# Patient Record
Sex: MALE | URBAN - METROPOLITAN AREA
[De-identification: names, ages, dates, MRNs, and addresses within clinical notes are randomized per-mention and may not be internally consistent; named-entity substitution may affect disease eponyms.]

---

## 2017-03-06 ENCOUNTER — OFFICE VISIT (OUTPATIENT)
Dept: FAMILY MEDICINE | Facility: CLINIC | Age: 37
End: 2017-03-06
Payer: COMMERCIAL

## 2017-03-06 VITALS
HEIGHT: 69 IN | WEIGHT: 212 LBS | DIASTOLIC BLOOD PRESSURE: 70 MMHG | TEMPERATURE: 98 F | BODY MASS INDEX: 31.4 KG/M2 | SYSTOLIC BLOOD PRESSURE: 120 MMHG | HEART RATE: 70 BPM

## 2017-03-06 DIAGNOSIS — Z13.1 DIABETES MELLITUS SCREENING: ICD-10-CM

## 2017-03-06 DIAGNOSIS — K21.9 GASTROESOPHAGEAL REFLUX DISEASE, ESOPHAGITIS PRESENCE NOT SPECIFIED: ICD-10-CM

## 2017-03-06 DIAGNOSIS — R10.31 RLQ ABDOMINAL PAIN: ICD-10-CM

## 2017-03-06 DIAGNOSIS — Z13.220 LIPID SCREENING: ICD-10-CM

## 2017-03-06 DIAGNOSIS — Z00.00 ENCOUNTER FOR ROUTINE ADULT HEALTH EXAMINATION WITHOUT ABNORMAL FINDINGS: Primary | ICD-10-CM

## 2017-03-06 DIAGNOSIS — K63.9 THICKENED SMALL BOWEL: ICD-10-CM

## 2017-03-06 DIAGNOSIS — Z23 NEED FOR PROPHYLACTIC VACCINATION AND INOCULATION AGAINST INFLUENZA: ICD-10-CM

## 2017-03-06 DIAGNOSIS — Z72.0 TOBACCO ABUSE: ICD-10-CM

## 2017-03-06 PROCEDURE — 36415 COLL VENOUS BLD VENIPUNCTURE: CPT | Performed by: PHYSICIAN ASSISTANT

## 2017-03-06 PROCEDURE — 90686 IIV4 VACC NO PRSV 0.5 ML IM: CPT | Performed by: PHYSICIAN ASSISTANT

## 2017-03-06 PROCEDURE — 90471 IMMUNIZATION ADMIN: CPT | Performed by: PHYSICIAN ASSISTANT

## 2017-03-06 PROCEDURE — 99212 OFFICE O/P EST SF 10 MIN: CPT | Mod: 25 | Performed by: PHYSICIAN ASSISTANT

## 2017-03-06 PROCEDURE — 80061 LIPID PANEL: CPT | Performed by: PHYSICIAN ASSISTANT

## 2017-03-06 PROCEDURE — 99395 PREV VISIT EST AGE 18-39: CPT | Mod: 25 | Performed by: PHYSICIAN ASSISTANT

## 2017-03-06 NOTE — PATIENT INSTRUCTIONS
"-- At home check what you are taking for heartburn  If it is Omeprazole (Prilosec) or Nexium you should try to stop this and try Zantac or Pepcid instead. If these medications dont work as well, please discuss with GI because long term risks of the other medications are not good and it is important to figure out if you need these medications or not.      Varenicline Tartrate (Chantix):  Chantix tablets Days                Dosage  0.5 mg                     1 through 3 0.5 mg once daily  0.5 mg                      4 through 7 0.5 mg twice daily  1.0 mg                     Day 8 to end  1 mg twice daily    Renal dosing:  For severe renal impairment, start at a dose of 0.5 mg daily and then titrate as needed to a maximum dose of 0.5 mg twice daily.  For patients with end-stage renal disease (ESRD) undergoing hemodialysis, a maximum dose of 0.5 mg daily may be administered if tolerated well.    How to take:  You will start taking Chantix one week before your quit date while you are still smoking. At first you will use the Chantix starter pack. Once you are finished with that, you will be on a \"maintenance dose\" that will probably stay the same for the rest of your treatment.  After a week of slowly increasing your dose, you will take Chantix twice a day. Take each dose after eating and with a full glass of water. Taking Chantix with food and water decreases nausea. That is also why you begin on a lower dose and slowly increase it. Once you begin the twice a day dosage, your 2 doses should be at least 8-10 hours apart and at least 4-5 hours before bedtime.      Duration of therapy: 12 weeks.  An additional course of 12 weeks of treatment is recommended if the first 12 weeks were successful to improve long term abstinence.    Adverse reactions:  The most common adverse reaction associated with varenicline treatment is nausea. Other common adverse effects include sleep disturbance, constipation, flatulence and " vomiting.    There have been warnings from the FDA to be on the lookout for erratic behavior, suicidal ideation, or suicidal behavior.   There is one case report of a death, though it appears alcohol consumption may have played a part in that case.    Please report any behavior or mood changes as well as being aware of drowsiness.   Be cautious when operating motor vehicles or dangerous machinery until the medication's effect on you is clear.      Preventive Health Recommendations  Male Ages 26 - 39    Yearly exam:             See your health care provider every year in order to  o   Review health changes.   o   Discuss preventive care.    o   Review your medicines if your doctor has prescribed any.    You should be tested each year for STDs (sexually transmitted diseases), if you re at risk.     After age 35, talk to your provider about cholesterol testing. If you are at risk for heart disease, have your cholesterol tested at least every 5 years.     If you are at risk for diabetes, you should have a diabetes test (fasting glucose).  Shots: Get a flu shot each year. Get a tetanus shot every 10 years.     Nutrition:    Eat at least 5 servings of fruits and vegetables daily.     Eat whole-grain bread, whole-wheat pasta and brown rice instead of white grains and rice.     Talk to your provider about Calcium and Vitamin D.     Lifestyle    Exercise for at least 150 minutes a week (30 minutes a day, 5 days a week). This will help you control your weight and prevent disease.     Limit alcohol to one drink per day.     No smoking.     Wear sunscreen to prevent skin cancer.     See your dentist every six months for an exam and cleaning.     HOW TO QUIT SMOKING  Smoking is one of the hardest habits to break. About half of all those who have ever smoked have been able to quit, and most of those (about 70%) who still smoke want to quit. Here are some of the best ways to stop smoking.     KEEP TRYING:  It takes most smokers  about 8 tries before they are finally able to fully quit. So, the more often you try and fail, the better your chance of quitting the next time! So, don't give up!    GO COLD TURKEY:  Most ex-smokers quit cold turkey. Trying to cut back gradually doesn't seem to work as well, perhaps because it continues the smoking habit. Also, it is possible to fool yourself by inhaling more while smoking fewer cigarettes. This results in the same amount of nicotine in your body!    GET SUPPORT:  Support programs can make an important difference, especially for the heavy smoker. These groups offer lectures, methods to change your behavior and peer support. Call the free national Quitline for more information. 800-QUIT-NOW (779-611-0624). Low-cost or free programs are offered by many hospitals, local chapters of the American Lung Association (153-203-6259) and the American Cancer Society (543-831-1436). Support at home is important too. Non-smokers can help by offering praise and encouragement. If the smoker fails to quit, encourage them to try again!    OVER-THE-COUNTER MEDICINES:  For those who can't quit on their own, Nicotine Replacement Therapy (NRT) may make quitting much easier. Certain aids such as the nicotine patch, gum and lozenge are available without a prescription. However, it is best to use these under the guidance of your doctor. The skin patch provides a steady supply of nicotine to the body. Nicotine gum and lozenge gives temporary bursts of low levels of nicotine. Both methods take the edge off the craving for cigarettes. WARNING: If you feel symptoms of nicotine overdose, such as nausea, vomiting, dizziness, weakness, or fast heartbeat, stop using these and see your doctor.    PRESCRIPTION MEDICINES:  After evaluating your smoking patterns and prior attempts at quitting, your doctor may offer a prescription medicine such as bupropion (Zyban, Wellbutrin), varenicline (Chantix, Champix), a niocotine inhaler or  nasal spray. Each has its unique advantage and side effects which your doctor can review with you.    HEALTH BENEFITS OF QUITTING:  The benefits of quitting start right away and keep improving the longer you go without smokin minutes: blood pressure and pulse return to normal  8 hours: oxygen levels return to normal  2 days: ability to smell and taste begins to improve as damaged nerves start to regrow  2-3 weeks: circulation and lung function improves  1-9 months: decreased cough, congestion and shortness of breath; less tired  1 year: risk of heart attack decreases by half  5 years: risk of lung cancer decreases by half; risk of stroke becomes the same as a non-smoker  For information about how to quit smoking, visit the following links:  National [employer] [place] ,   Clearing the Air, Quit Smoking Today   - an online booklet. http://www.smokefree.gov/pubs/clearing_the_air.pdf  Smokefree.gov http://smokefree.gov/  QuitNet http://www.quitnet.com/    7332-9635 [facility], [address] [place] [address]. All rights reserved. This information is not intended as a substitute for professional medical care. Always follow your healthcare professional's instructions.    The Benefits of Living Smoke Free  What do you want to gain from quitting? Check off some reasons to quit.  Health Benefits  ___ Reduce my risk of lung cancer, heart disease, chronic lung disease  ___ Have fewer wrinkles and softer skin  ___ Improve my sense of taste and smell  ___ For pregnant women reduce the risk of having a miscarriage, stillbirth, premature birth, or low-birth-weight baby  Personal Benefits  ___ Feel more in control of my life  ___ Have better-smelling hair, breath, clothes, home, and car  ___ Save time by not having to take smoke breaks, buy cigarettes, or hunt for a light  ___ Have whiter teeth  Family Benefits  ___ Reduce my children s respiratory tract infections  ___ Set a good example for my children  ___  Reduce my family s cancer risk  Financial Benefits  ___ Save hundreds of dollars each year that would be spent on cigarettes  ___ Save money on medical bills  ___ Save on life, health, and car insurance premiums    Those Dollars Add Up!  Cigarettes are expensive, and getting more expensive all the time. Do you realize how much money you are spending on cigarettes per year? What is the average amount you spend on a pack of cigarettes? What is the average number of packs that you smoke per day? Using your answers to these questions, fill in this formula to help you find out:  ($ _____ per pack) ×  ( _____ number of packs per day) × (365 days) =  $ _____ yearly cost of smoking  Besides tobacco, there are other costs, including extra cleaning bills and replacement costs for clothing and furniture; medical expenses for smoking-related illnesses; and higher health, life, and car insurance premiums.    Cigars and Pipes Count Too!  Cigars and pipes are also dangerous. So are smokeless (chewing) tobacco and snuff. All of these products contain nicotine, a highly addictive substance that has harmful effects on your body. Quitting smoking means giving up all tobacco products.      1725-9213 [facility] [place], [address] [place] [address], [place] [address]. All rights reserved. This information is not intended as a substitute for professional medical care. Always follow your healthcare professional's instructions.

## 2017-03-06 NOTE — MR AVS SNAPSHOT
"              After Visit Summary   3/6/2017    Nicholas Rojas    MRN: [id number]           Patient Information     Date Of Birth          1980        Visit Information        Provider Department      3/6/2017 10:20 AM Elenita Givens PA-C [facility]        Today's Diagnoses     Lipid screening    -  1    Diabetes mellitus screening        RLQ abdominal pain        Thickened small bowel        Gastroesophageal reflux disease, esophagitis presence not specified        Tobacco abuse          Care Instructions    -- At home check what you are taking for heartburn  If it is Omeprazole (Prilosec) or Nexium you should try to stop this and try Zantac or Pepcid instead. If these medications dont work as well, please discuss with GI because long term risks of the other medications are not good and it is important to figure out if you need these medications or not.      Varenicline Tartrate (Chantix):  Chantix tablets Days                Dosage  0.5 mg                     1 through 3 0.5 mg once daily  0.5 mg                      4 through 7 0.5 mg twice daily  1.0 mg                     Day 8 to end  1 mg twice daily    Renal dosing:  For severe renal impairment, start at a dose of 0.5 mg daily and then titrate as needed to a maximum dose of 0.5 mg twice daily.  For patients with end-stage renal disease (ESRD) undergoing hemodialysis, a maximum dose of 0.5 mg daily may be administered if tolerated well.    How to take:  You will start taking Chantix one week before your quit date while you are still smoking. At first you will use the Chantix starter pack. Once you are finished with that, you will be on a \"maintenance dose\" that will probably stay the same for the rest of your treatment.  After a week of slowly increasing your dose, you will take Chantix twice a day. Take each dose after eating and with a full glass of water. Taking Chantix with food and water decreases nausea. That is also why you " begin on a lower dose and slowly increase it. Once you begin the twice a day dosage, your 2 doses should be at least 8-10 hours apart and at least 4-5 hours before bedtime.      Duration of therapy: 12 weeks.  An additional course of 12 weeks of treatment is recommended if the first 12 weeks were successful to improve long term abstinence.    Adverse reactions:  The most common adverse reaction associated with varenicline treatment is nausea. Other common adverse effects include sleep disturbance, constipation, flatulence and vomiting.    There have been warnings from the FDA to be on the lookout for erratic behavior, suicidal ideation, or suicidal behavior.   There is one case report of a death, though it appears alcohol consumption may have played a part in that case.    Please report any behavior or mood changes as well as being aware of drowsiness.   Be cautious when operating motor vehicles or dangerous machinery until the medication's effect on you is clear.      Preventive Health Recommendations  Male Ages 26 - 39    Yearly exam:             See your health care provider every year in order to  o   Review health changes.   o   Discuss preventive care.    o   Review your medicines if your doctor has prescribed any.    You should be tested each year for STDs (sexually transmitted diseases), if you re at risk.     After age 35, talk to your provider about cholesterol testing. If you are at risk for heart disease, have your cholesterol tested at least every 5 years.     If you are at risk for diabetes, you should have a diabetes test (fasting glucose).  Shots: Get a flu shot each year. Get a tetanus shot every 10 years.     Nutrition:    Eat at least 5 servings of fruits and vegetables daily.     Eat whole-grain bread, whole-wheat pasta and brown rice instead of white grains and rice.     Talk to your provider about Calcium and Vitamin D.     Lifestyle    Exercise for at least 150 minutes a week (30 minutes a  day, 5 days a week). This will help you control your weight and prevent disease.     Limit alcohol to one drink per day.     No smoking.     Wear sunscreen to prevent skin cancer.     See your dentist every six months for an exam and cleaning.     HOW TO QUIT SMOKING  Smoking is one of the hardest habits to break. About half of all those who have ever smoked have been able to quit, and most of those (about 70%) who still smoke want to quit. Here are some of the best ways to stop smoking.     KEEP TRYING:  It takes most smokers about 8 tries before they are finally able to fully quit. So, the more often you try and fail, the better your chance of quitting the next time! So, don't give up!    GO COLD TURKEY:  Most ex-smokers quit cold turkey. Trying to cut back gradually doesn't seem to work as well, perhaps because it continues the smoking habit. Also, it is possible to fool yourself by inhaling more while smoking fewer cigarettes. This results in the same amount of nicotine in your body!    GET SUPPORT:  Support programs can make an important difference, especially for the heavy smoker. These groups offer lectures, methods to change your behavior and peer support. Call the free national Quitline for more information. 800-QUIT-NOW (123-778-0795). Low-cost or free programs are offered by many hospitals, local chapters of the American Lung Association (853-915-6246) and the American Cancer Society (410-062-8231). Support at home is important too. Non-smokers can help by offering praise and encouragement. If the smoker fails to quit, encourage them to try again!    OVER-THE-COUNTER MEDICINES:  For those who can't quit on their own, Nicotine Replacement Therapy (NRT) may make quitting much easier. Certain aids such as the nicotine patch, gum and lozenge are available without a prescription. However, it is best to use these under the guidance of your doctor. The skin patch provides a steady supply of nicotine to the body.  Nicotine gum and lozenge gives temporary bursts of low levels of nicotine. Both methods take the edge off the craving for cigarettes. WARNING: If you feel symptoms of nicotine overdose, such as nausea, vomiting, dizziness, weakness, or fast heartbeat, stop using these and see your doctor.    PRESCRIPTION MEDICINES:  After evaluating your smoking patterns and prior attempts at quitting, your doctor may offer a prescription medicine such as bupropion (Zyban, Wellbutrin), varenicline (Chantix, Champix), a niocotine inhaler or nasal spray. Each has its unique advantage and side effects which your doctor can review with you.    HEALTH BENEFITS OF QUITTING:  The benefits of quitting start right away and keep improving the longer you go without smokin minutes: blood pressure and pulse return to normal  8 hours: oxygen levels return to normal  2 days: ability to smell and taste begins to improve as damaged nerves start to regrow  2-3 weeks: circulation and lung function improves  1-9 months: decreased cough, congestion and shortness of breath; less tired  1 year: risk of heart attack decreases by half  5 years: risk of lung cancer decreases by half; risk of stroke becomes the same as a non-smoker  For information about how to quit smoking, visit the following links:  National [employer] ,   Clearing the Air, Quit Smoking Today   - an online booklet. http://www.smokefree.gov/pubs/clearing_the_air.pdf  Smokefree.gov http://smokefree.gov/  QuitNet http://www.quitnet.com/    8460-5674 [facility], [address] [place] [address], [place] [address]. All rights reserved. This information is not intended as a substitute for professional medical care. Always follow your healthcare professional's instructions.    The Benefits of Living Smoke Free  What do you want to gain from quitting? Check off some reasons to quit.  Health Benefits  ___ Reduce my risk of lung cancer, heart disease, chronic lung disease  ___ Have fewer  wrinkles and softer skin  ___ Improve my sense of taste and smell  ___ For pregnant women--reduce the risk of having a miscarriage, stillbirth, premature birth, or low-birth-weight baby  Personal Benefits  ___ Feel more in control of my life  ___ Have better-smelling hair, breath, clothes, home, and car  ___ Save time by not having to take smoke breaks, buy cigarettes, or hunt for a light  ___ Have whiter teeth  Family Benefits  ___ Reduce my children s respiratory tract infections  ___ Set a good example for my children  ___ Reduce my family s cancer risk  Financial Benefits  ___ Save hundreds of dollars each year that would be spent on cigarettes  ___ Save money on medical bills  ___ Save on life, health, and car insurance premiums    Those Dollars Add Up!  Cigarettes are expensive, and getting more expensive all the time. Do you realize how much money you are spending on cigarettes per year? What is the average amount you spend on a pack of cigarettes? What is the average number of packs that you smoke per day? Using your answers to these questions, fill in this formula to help you find out:  ($ _____ per pack) ×  ( _____ number of packs per day) × (365 days) =  $ _____ yearly cost of smoking  Besides tobacco, there are other costs, including extra cleaning bills and replacement costs for clothing and furniture; medical expenses for smoking-related illnesses; and higher health, life, and car insurance premiums.    Cigars and Pipes Count Too!  Cigars and pipes are also dangerous. So are smokeless (chewing) tobacco and snuff. All of these products contain nicotine, a highly addictive substance that has harmful effects on your body. Quitting smoking means giving up all tobacco products.      1032-3394 [employer], [address] [place] [address], [place] [address]. All rights reserved. This information is not intended as a substitute for professional medical care. Always follow your healthcare professional's  "instructions.        Follow-ups after your visit        Additional Services     GASTROENTEROLOGY ADULT REF CONSULT ONLY       Preferred Location: [facility] [phone]      Please be aware that coverage of these services is subject to the terms and limitations of your health insurance plan.  Call member services at your health plan with any benefit or coverage questions.  Any procedures must be performed at a [facility] facility OR coordinated by your clinic's referral office.    Please bring the following with you to your appointment:    (1) Any X-Rays, CTs or MRIs which have been performed.  Contact the facility where they were done to arrange for  prior to your scheduled appointment.    (2) List of current medications   (3) This referral request   (4) Any documents/labs given to you for this referral                  Who to contact     If you have questions or need follow up information about today's clinic visit or your schedule please contact [facility] directly at [phone].  Normal or non-critical lab and imaging results will be communicated to you by MyChart, letter or phone within 4 business days after the clinic has received the results. If you do not hear from us within 7 days, please contact the clinic through MyChart or phone. If you have a critical or abnormal lab result, we will notify you by phone as soon as possible.  Submit refill requests through Zipzoom or call your pharmacy and they will forward the refill request to us. Please allow 3 business days for your refill to be completed.          Additional Information About Your Visit        Zipzoom Information     Zipzoom lets you send messages to your doctor, view your test results, renew your prescriptions, schedule appointments and more. To sign up, go to [email or web address] . Click on \"Log in\" on the left side of the screen, which will take you to the Welcome page. Then click on \"Sign up Now\" on the right side of " "the page.     You will be asked to enter the access code listed below, as well as some personal information. Please follow the directions to create your username and password.     Your access code is: [email or web address]  Expires: 2017 11:07 AM     Your access code will  in 90 days. If you need help or a new code, please call your [facility] clinic or [phone].        Care EveryWhere ID     This is your Care EveryWhere ID. This could be used by other organizations to access your [facility] medical records  [id number]        Your Vitals Were     Pulse Temperature Height BMI (Body Mass Index)          70 98  F (36.7  C) (Oral) 5' 8.75\" (1.746 m) 31.54 kg/m2         Blood Pressure from Last 3 Encounters:   17 120/70   16 124/70   16 108/64    Weight from Last 3 Encounters:   17 212 lb (96.2 kg)   16 212 lb (96.2 kg)   16 209 lb (94.8 kg)              We Performed the Following     GASTROENTEROLOGY ADULT REF CONSULT ONLY     Lipid panel reflex to direct LDL     Tobacco Cessation - for Health Maintenance          Today's Medication Changes          These changes are accurate as of: 3/6/17 11:07 AM.  If you have any questions, ask your nurse or doctor.               Start taking these medicines.        Dose/Directions    varenicline 0.5 MG X 11 & 1 MG X 42 tablet   Commonly known as:  CHANTIX STARTING MONTH    Used for:  Tobacco abuse   Started by:  Elenita Givens PA-C        Take 0.5 mg tab daily for 3 days, then 0.5 mg tab twice daily for 4 days, then 1 mg twice daily.   Quantity:  53 tablet   Refills:  0         Stop taking these medicines if you haven't already. Please contact your care team if you have questions.     [employer] NICOTINE POLACRILEX 4 MG gum   Generic drug:  nicotine polacrilex   Stopped by:  Elenita Givens PA-C           OMEPRAZOLE PO   Stopped by:  Elenita Givens PA-C                Where to get your medicines      These medications were sent to [facility] [place] - [address]  [address] [facility] [address]     Phone:  [phone]     varenicline 0.5 MG X 11 & 1 MG X 42 tablet                Primary Care Provider    None Specified       No primary provider on file.        Thank you!     Thank you for choosing [facility]  for your care. Our goal is always to provide you with excellent care. Hearing back from our patients is one way we can continue to improve our services. Please take a few minutes to complete the written survey that you may receive in the mail after your visit with us. Thank you!             Your Updated Medication List - Protect others around you: Learn how to safely use, store and throw away your medicines at www.disposemymeds.org.          This list is accurate as of: 3/6/17 11:07 AM.  Always use your most recent med list.                   Brand Name Dispense Instructions for use    varenicline 0.5 MG X 11 & 1 MG X 42 tablet    CHANTIX STARTING MONTH CHARLI    53 tablet    Take 0.5 mg tab daily for 3 days, then 0.5 mg tab twice daily for 4 days, then 1 mg twice daily.

## 2017-03-06 NOTE — NURSING NOTE
"Chief Complaint   Patient presents with     Physical     Other     Due for Lipids       Initial /70 (BP Location: Right arm, Patient Position: Chair, Cuff Size: Adult Regular)  Pulse 70  Temp 98  F (36.7  C) (Oral)  Ht 5' 8.75\" (1.746 m)  Wt 212 lb (96.2 kg)  BMI 31.54 kg/m2 Estimated body mass index is 31.54 kg/(m^2) as calculated from the following:    Height as of this encounter: 5' 8.75\" (1.746 m).    Weight as of this encounter: 212 lb (96.2 kg).  Medication Reconciliation: complete   Purvi Pascual MA      "

## 2017-03-06 NOTE — PROGRESS NOTES
SUBJECTIVE:     CC: Nicholas Rojas is an 36 year old male who presents for preventative health visit.     Healthy Habits:    Do you get at least three servings of calcium containing foods daily (dairy, green leafy vegetables, etc.)? yes    Amount of exercise or daily activities, outside of work: 0 day(s) per week, planning to start    Problems taking medications regularly No    Medication side effects: No    Have you had an eye exam in the past two years? yes    Do you see a dentist twice per year? yes    Do you have sleep apnea, excessive snoring or daytime drowsiness?no    Follow up in regards to GI issues. He reports improvement but still occurring.  Still has RLQ pain persistent. He had CT that showed some small bowel thickening, but nothing else.  His stools are still normal.  gerd persists, but also still smoking.   Still taking omeprazole d aily, without it has symptoms.    Today's PHQ-2 Score:   PHQ-2 ( 1999 Pfizer) 3/6/2017 11/14/2016   Q1: Little interest or pleasure in doing things 0 0   Q2: Feeling down, depressed or hopeless 0 0   PHQ-2 Score 0 0       Abuse: Current or Past(Physical, Sexual or Emotional)- No  Do you feel safe in your environment - Yes    Social History   Substance Use Topics     Smoking status: Current Every Day Smoker     Types: Cigarettes     Last attempt to quit: 11/11/2016     Smokeless tobacco: Current User     Types: Chew     Alcohol use 0.0 oz/week     0 Standard drinks or equivalent per week      Comment: 2 drinks a day     The patient does not drink >3 drinks per day nor >7 drinks per week.    Last PSA: No results found for: PSA    No results for input(s): CHOL, HDL, LDL, TRIG, CHOLHDLRATIO, NHDL in the last 63195 hours.    Reviewed orders with patient. Reviewed health maintenance and updated orders accordingly - Yes    Reviewed and updated as needed this visit by clinical staff  Tobacco  Allergies  Med Hx  Surg Hx  Fam Hx  Soc Hx        Reviewed and updated as needed  "this visit by Provider            ROS:  A pertinent ROS of the General  ENT  Cardiovascular  Pulmonary  GI  Musculoskeletal   Neurological  Integumentary  Psychological   systems is otherwise unremarkable.         Problem list, Medication list, Allergies, and Medical/Social/Surgical histories reviewed in EPIC and updated as appropriate.  OBJECTIVE:     /70 (BP Location: Right arm, Patient Position: Chair, Cuff Size: Adult Regular)  Pulse 70  Temp 98  F (36.7  C) (Oral)  Ht 5' 8.75\" (1.746 m)  Wt 212 lb (96.2 kg)  BMI 31.54 kg/m2  EXAM:  GENERAL: healthy, alert and no distress  EYES: Eyes grossly normal to inspection, PERRL and conjunctivae and sclerae normal  HENT: ear canals and TM's normal, nose and mouth without ulcers or lesions  NECK: no adenopathy, no asymmetry, masses, or scars and thyroid normal to palpation  RESP: lungs clear to auscultation - no rales, rhonchi or wheezes  CV: regular rate and rhythm, normal S1 S2, no S3 or S4, no murmur, click or rub, no peripheral edema and peripheral pulses strong  ABDOMEN: soft, nontender, no hepatosplenomegaly, no masses and bowel sounds normal  MS: no gross musculoskeletal defects noted, no edema  SKIN: no suspicious lesions or rashes  NEURO: Normal strength and tone, mentation intact and speech normal  PSYCH: mentation appears normal, affect normal/bright    ASSESSMENT/PLAN:     1. Encounter for routine adult health examination without abnormal findings    2. RLQ abdominal pain  3. Thickened small bowel  Discussed options with patient. Pain is still there. Recommend he see gastro to discuss findings on CT along with his ongoing symptoms. He can also see them related to his gerd as he is still taking omeprazole. May need endoscopy. Strongly encouraged him to quit smoking to help with this.   - GASTROENTEROLOGY ADULT REF CONSULT ONLY  - OFFICE/OUTPT VISIT,ESTLEVL III    6. Lipid screening  - Lipid panel reflex to direct LDL    7. Tobacco abuse  Side effects, " "risks and benefits of medication were discussed with patient.   - Tobacco Cessation - for Health Maintenance  - varenicline (CHANTIX STARTING MONTH CHARLI) 0.5 MG X 11 & 1 MG X 42 tablet; Take 0.5 mg tab daily for 3 days, then 0.5 mg tab twice daily for 4 days, then 1 mg twice daily.  Dispense: 53 tablet; Refill: 0    8. Need for prophylactic vaccination and inoculation against influenza  - FLU VAC, SPLIT VIRUS IM > 3 YO (QUADRIVALENT) [08364]  - Vaccine Administration, Initial [94401]    COUNSELING:  Reviewed preventive health counseling, as reflected in patient instructions       Regular exercise       Healthy diet/nutrition         reports that he has been smoking Cigarettes.  His smokeless tobacco use includes Chew.  Tobacco Cessation Action Plan: Pharmacotherapies : Chantix  Estimated body mass index is 31.54 kg/(m^2) as calculated from the following:    Height as of this encounter: 5' 8.75\" (1.746 m).    Weight as of this encounter: 212 lb (96.2 kg).   Weight management plan: Discussed healthy diet and exercise guidelines and patient will follow up in 6 months in clinic to re-evaluate.    Counseling Resources:  ATP IV Guidelines  Pooled Cohorts Equation Calculator  FRAX Risk Assessment  ICSI Preventive Guidelines  Dietary Guidelines for Americans, 2010  USDA's MyPlate  ASA Prophylaxis  Lung CA Screening    Elenita Givens PA-C  [facility]  "

## 2017-03-06 NOTE — PROGRESS NOTES
Injectable Influenza Immunization Documentation    1.  Is the person to be vaccinated sick today?  No    2. Does the person to be vaccinated have an allergy to eggs or to a component of the vaccine?  No    3. Has the person to be vaccinated today ever had a serious reaction to influenza vaccine in the past?  No    4. Has the person to be vaccinated ever had Guillain-Smithsburg syndrome?  No     Form completed by patient

## 2017-03-06 NOTE — LETTER
"[facility]  [address] [id number] [phone]      March 9, 2017      Nicholas Rojas  [address] [address]              Dear Nicholas,    Your cholesterol is a little high.   Ways to improve your cholesterol...     1- Eats less saturated fats (including avoiding \"trans\" fats).     2 - Eat more unsaturated fats  - found in vegetables, grains, and tree nuts.   Also by replacing butter with canola oil or olive oil.     3 - Eat more nuts.   1-2 ounces (a small handful) of almonds, walnuts, hazelnuts or pecans once a day in place of other less healthy snacks.     4 - Eat more high fiber foods - vegetables and whole grains including oat bran, oats, beans, peas, and flax seed.     5 - Eat more fish - such as salmon, tuna, mackerel, and sardines.  1 or 2 six ounce servings per week is a healthy replacement for other proteins.     6 - Exercise for at least 120 minutes per week - which is equal to 30 minutes 4 days per week.           Sincerely,    Elenita Givens PA-C/ap    Results for orders placed or performed in visit on 03/06/17   Lipid panel reflex to direct LDL   Result Value Ref Range    Cholesterol 184 <200 mg/dL    Triglycerides 122 <150 mg/dL    HDL Cholesterol 45 >39 mg/dL    LDL Cholesterol Calculated 115 (H) <100 mg/dL    Non HDL Cholesterol 139 (H) <130 mg/dL         "

## 2017-03-07 LAB
CHOLEST SERPL-MCNC: 184 MG/DL
HDLC SERPL-MCNC: 45 MG/DL
LDLC SERPL CALC-MCNC: 115 MG/DL
NONHDLC SERPL-MCNC: 139 MG/DL
TRIGL SERPL-MCNC: 122 MG/DL

## 2017-08-06 ENCOUNTER — TRANSFERRED RECORDS (OUTPATIENT)
Dept: HEALTH INFORMATION MANAGEMENT | Facility: CLINIC | Age: 37
End: 2017-08-06

## 2017-11-08 ENCOUNTER — RADIANT APPOINTMENT (OUTPATIENT)
Dept: GENERAL RADIOLOGY | Facility: CLINIC | Age: 37
End: 2017-11-08
Attending: FAMILY MEDICINE
Payer: COMMERCIAL

## 2017-11-08 ENCOUNTER — OFFICE VISIT (OUTPATIENT)
Dept: FAMILY MEDICINE | Facility: CLINIC | Age: 37
End: 2017-11-08
Payer: COMMERCIAL

## 2017-11-08 VITALS
OXYGEN SATURATION: 96 % | TEMPERATURE: 98.2 F | HEART RATE: 74 BPM | WEIGHT: 217 LBS | SYSTOLIC BLOOD PRESSURE: 120 MMHG | DIASTOLIC BLOOD PRESSURE: 74 MMHG | HEIGHT: 69 IN | BODY MASS INDEX: 32.14 KG/M2

## 2017-11-08 DIAGNOSIS — R05.3 CHRONIC COUGH: ICD-10-CM

## 2017-11-08 DIAGNOSIS — Z72.0 TOBACCO ABUSE: ICD-10-CM

## 2017-11-08 DIAGNOSIS — R05.3 CHRONIC COUGH: Primary | ICD-10-CM

## 2017-11-08 PROCEDURE — 99214 OFFICE O/P EST MOD 30 MIN: CPT | Performed by: FAMILY MEDICINE

## 2017-11-08 PROCEDURE — 71020 XR CHEST 2 VW: CPT

## 2017-11-08 RX ORDER — VARENICLINE TARTRATE 1 MG/1
1 TABLET, FILM COATED ORAL DAILY
Qty: 60 TABLET | Refills: 0 | Status: SHIPPED | OUTPATIENT
Start: 2017-11-08

## 2017-11-08 RX ORDER — ESOMEPRAZOLE MAGNESIUM 40 MG/1
40 CAPSULE, DELAYED RELEASE ORAL
Qty: 30 CAPSULE | Refills: 1 | Status: SHIPPED | OUTPATIENT
Start: 2017-11-08 | End: 2018-02-13

## 2017-11-08 NOTE — PATIENT INSTRUCTIONS
For the acid reflux you need to take the medication for at least two months to see if it will help with the cough.  You do not need to use any over the counter medication during this time.    With the chantix starter kit you can stop at the one mg dose daily and you do not have to increase to 1 mg twice daily

## 2017-11-08 NOTE — PROGRESS NOTES
SUBJECTIVE:   Nicholas Rojas is a 37 year old male who presents to clinic today for the following health issues:      Concern - Chronic Cough   Onset: 3 years     Description:   Patient has had a chronic cough for 3 years. Worse in the morning. Coughs up a lot of phlegm with black specks in it. Patient currently smokes 5-6 cigarettes a day.     Intensity: moderate    Progression of Symptoms:  worsening    Accompanying Signs & Symptoms:  None     Previous history of similar problem:   None     Precipitating factors:   Worsened by: Worse in then morning     Alleviating factors:  Improved by: None     Therapies Tried and outcome: Nothing     Feels cough is getting worse, mostly in the morning.  No hemoptysis on a regular basis, maybe 3 times in the last year.  Gets heartburn on a daily basis and takes an otc med for it.  No hx of asthma.  No wheezing or shortness of breath.  Unsure if he coughs much during the night.  No globus sensation.  Has not seen a provider for this or had imaging done in the past.  Some pain in the left abdominal muscles as well with coughing.  No weight loss, fever or chills with the cough.        Problem list and histories reviewed & adjusted, as indicated.  Additional history: as documented    Patient Active Problem List   Diagnosis     RLQ abdominal pain     Thickened small bowel     Gastroesophageal reflux disease, esophagitis presence not specified     Past Surgical History:   Procedure Laterality Date     HERNIA REPAIR, INGUINAL RT/LT Left 1990       Social History   Substance Use Topics     Smoking status: Current Every Day Smoker     Packs/day: 0.40     Types: Cigarettes     Last attempt to quit: 11/11/2016     Smokeless tobacco: Current User     Types: Chew     Alcohol use 1.2 oz/week     0 Standard drinks or equivalent, 2 Shots of liquor per week      Comment: 2 drinks a day     Family History   Problem Relation Age of Onset     Hypertension Father      DIABETES Father      DIABETES  "Mother          Current Outpatient Prescriptions   Medication Sig Dispense Refill     varenicline (CHANTIX STARTING MONTH CHARLI) 0.5 MG X 11 & 1 MG X 42 tablet Take 0.5 mg tab daily for 3 days, then 0.5 mg tab twice daily for 4 days, then 1 mg twice daily. (Patient not taking: Reported on 11/8/2017) 53 tablet 0     No Known Allergies  BP Readings from Last 3 Encounters:   11/08/17 120/74   03/06/17 120/70   11/22/16 124/70    Wt Readings from Last 3 Encounters:   11/08/17 217 lb (98.4 kg)   03/06/17 212 lb (96.2 kg)   11/22/16 212 lb (96.2 kg)                        Reviewed and updated as needed this visit by clinical staffTobacco  Allergies  Meds  Problems  Med Hx  Surg Hx  Fam Hx  Soc Hx        Reviewed and updated as needed this visit by Provider  Tobacco  Allergies  Meds  Problems  Surg Hx  Fam Hx  Soc Hx        ROS:  Constitutional, HEENT, cardiovascular, pulmonary, gi and gu systems are negative, except as otherwise noted.      OBJECTIVE:   /74 (BP Location: Right arm, Cuff Size: Adult Large)  Pulse 74  Temp 98.2  F (36.8  C) (Oral)  Ht 5' 8.75\" (1.746 m)  Wt 217 lb (98.4 kg)  SpO2 96%  BMI 32.28 kg/m2  Body mass index is 32.28 kg/(m^2).  GENERAL: healthy, alert and no distress  EYES: Eyes grossly normal to inspection, PERRL and conjunctivae and sclerae normal  HENT: ear canals and TM's normal, nose and mouth without ulcers or lesions  NECK: no adenopathy, no asymmetry, masses, or scars and thyroid normal to palpation  RESP: lungs clear to auscultation - no rales, rhonchi or wheezes and frequent sporadic cough  CV: regular rate and rhythm, normal S1 S2, no S3 or S4, no murmur, click or rub, no peripheral edema and peripheral pulses strong  MS: no gross musculoskeletal defects noted, no edema  SKIN: no suspicious lesions or rashes  NEURO: Normal strength and tone, mentation intact and speech normal  PSYCH: mentation appears normal, affect normal/bright    Diagnostic Test Results:  Xray - " no acute disease noted.  Heart normal size.  Reviewed with patient.    ASSESSMENT/PLAN:         Tobacco Cessation:   reports that he has been smoking Cigarettes.  He has been smoking about 0.40 packs per day. His smokeless tobacco use includes Chew.  Tobacco Cessation Action Plan: Phone counseling: Place order for QuitPlan (Tobacco Cessation [facility] Referral 2203)  Pharmacotherapies : Chantix        1. Chronic cough  Normal cxr today.  Treat for gerd at this time with nexium and refer to allergy for asthma/possible allergic cause given black flecks in sputum in the morning.  Has noted periodic improvement if he quits smoking and cough lessens, may have some chronic bronchitis type symptoms as well.  - XR Chest 2 Views; Future  - ALLERGY/ASTHMA ADULT REFERRAL  - esomeprazole (NEXIUM) 40 MG CR capsule; Take 1 capsule (40 mg) by mouth every morning (before breakfast) Take 30-60 minutes before a eating.  Dispense: 30 capsule; Refill: 1    2. Tobacco abuse  Options were reviewed.  Never filled the chantix he had before and will again at this time at his request.  Side effects were reviewed.  quitplan information given as well.  - varenicline (CHANTIX STARTING MONTH CHARLI) 0.5 MG X 11 & 1 MG X 42 tablet; Take 0.5 mg tab daily for 3 days, then 0.5 mg tab twice daily for 4 days, then 1 mg twice daily.  Dispense: 53 tablet; Refill: 0  - varenicline (CHANTIX) 1 MG tablet; Take 1 tablet (1 mg) by mouth daily  Dispense: 60 tablet; Refill: 0    See Patient Instructions    Romain Riggs MD  [facility]

## 2017-11-08 NOTE — NURSING NOTE
"Chief Complaint   Patient presents with     Chronic Cough     cough for 3 years      Flu Shot     Pt declined        Initial /74 (BP Location: Right arm, Cuff Size: Adult Large)  Pulse 74  Temp 98.2  F (36.8  C) (Oral)  Ht 5' 8.75\" (1.746 m)  Wt 217 lb (98.4 kg)  BMI 32.28 kg/m2 Estimated body mass index is 32.28 kg/(m^2) as calculated from the following:    Height as of this encounter: 5' 8.75\" (1.746 m).    Weight as of this encounter: 217 lb (98.4 kg).  Medication Reconciliation: complete     Elenita [place] CMA (AAMA)      "

## 2017-11-08 NOTE — MR AVS SNAPSHOT
After Visit Summary   11/8/2017    Nicholas Rojas    MRN: [id number]           Patient Information     Date Of Birth          1980        Visit Information        Provider Department      11/8/2017 1:00 PM Romain Riggs MD [facility]        Today's Diagnoses     Chronic cough    -  1    Tobacco abuse          Care Instructions    For the acid reflux you need to take the medication for at least two months to see if it will help with the cough.  You do not need to use any over the counter medication during this time.    With the chantix starter kit you can stop at the one mg dose daily and you do not have to increase to 1 mg twice daily          Follow-ups after your visit        Additional Services     ALLERGY/ASTHMA ADULT REFERRAL       Your provider has referred you to: [facility] [place]: [facility] [phone]  [email or web address]/[place]/    Please be aware that coverage of these services is subject to the terms and limitations of your health insurance plan.  Call member services at your health plan with any benefit or coverage questions.      Please bring the following with you to your appointment:    (1) Any X-Rays, CTs or MRIs which have been performed.  Contact the facility where they were done to arrange for  prior to your scheduled appointment.    (2) List of current medications  (3) This referral request   (4) Any documents/labs given to you for this referral                  Who to contact     If you have questions or need follow up information about today's clinic visit or your schedule please contact [facility] directly at [phone].  Normal or non-critical lab and imaging results will be communicated to you by MyChart, letter or phone within 4 business days after the clinic has received the results. If you do not hear from us within 7 days, please contact the clinic through MyChart or phone. If you  "have a critical or abnormal lab result, we will notify you by phone as soon as possible.  Submit refill requests through [employer] or call your pharmacy and they will forward the refill request to us. Please allow 3 business days for your refill to be completed.          Additional Information About Your Visit        [facility] Information     RunTitle lets you send messages to your doctor, view your test results, renew your prescriptions, schedule appointments and more. To sign up, go to [email or web address] . Click on \"Log in\" on the left side of the screen, which will take you to the Welcome page. Then click on \"Sign up Now\" on the right side of the page.     You will be asked to enter the access code listed below, as well as some personal information. Please follow the directions to create your username and password.     Your access code is: [email or web address]  Expires: 2018  2:45 PM     Your access code will  in 90 days. If you need help or a new code, please call your [facility] clinic or [phone].        Care EveryWhere ID     This is your Care EveryWhere ID. This could be used by other organizations to access your [facility] medical records  [id number]        Your Vitals Were     Pulse Temperature Height Pulse Oximetry BMI (Body Mass Index)       74 98.2  F (36.8  C) (Oral) 5' 8.75\" (1.746 m) 96% 32.28 kg/m2        Blood Pressure from Last 3 Encounters:   17 120/74   17 120/70   16 124/70    Weight from Last 3 Encounters:   17 217 lb (98.4 kg)   17 212 lb (96.2 kg)   16 212 lb (96.2 kg)              We Performed the Following     ALLERGY/ASTHMA ADULT REFERRAL          Today's Medication Changes          These changes are accurate as of: 17  2:45 PM.  If you have any questions, ask your nurse or doctor.               Start taking these medicines.        Dose/Directions    esomeprazole 40 MG CR capsule   Commonly known as:  nexIUM   Used for:  Chronic cough   Started " by:  Romain Riggs MD        Dose:  40 mg   Take 1 capsule (40 mg) by mouth every morning (before breakfast) Take 30-60 minutes before a eating.   Quantity:  30 capsule   Refills:  1         These medicines have changed or have updated prescriptions.        Dose/Directions    * varenicline 0.5 MG X 11 & 1 MG X 42 tablet   Commonly known as:  CHANTIX STARTING MONTH PAK   This may have changed:  Another medication with the same name was added. Make sure you understand how and when to take each.   Used for:  Tobacco abuse   Changed by:  Romain Riggs MD        Take 0.5 mg tab daily for 3 days, then 0.5 mg tab twice daily for 4 days, then 1 mg twice daily.   Quantity:  53 tablet   Refills:  0       * varenicline 1 MG tablet   Commonly known as:  CHANTIX   This may have changed:  You were already taking a medication with the same name, and this prescription was added. Make sure you understand how and when to take each.   Used for:  Tobacco abuse   Changed by:  Romain Riggs MD        Dose:  1 mg   Take 1 tablet (1 mg) by mouth daily   Quantity:  60 tablet   Refills:  0       * Notice:  This list has 2 medication(s) that are the same as other medications prescribed for you. Read the directions carefully, and ask your doctor or other care provider to review them with you.         Where to get your medicines      [id number] [address] AT [facility] & [place]  [address] [facility] [place] [address]     Phone:  [phone]     esomeprazole 40 MG CR capsule    varenicline 0.5 MG X 11 & 1 MG X 42 tablet         Some of these will need a paper prescription and others can be bought over the counter.  Ask your nurse if you have questions.     Bring a paper prescription for each of these medications     varenicline 1 MG tablet                Primary Care Provider Office Phone # Fax #    [facility]  [facility] [phone]       [address] [facility] [address]        Equal Access to Services     JUNE NINO : Hadii aad ku hadjudimark Soelijah, waniida luqadaha, qadavidta kaalmada jazmine, bill swansonelma yonathan. So [facility] [phone].    ATENCIÓN: Si habla español, tiene a mcmanus disposición servicios gratuitos de asistencia lingüística. Llame al [phone].    We comply with applicable federal civil rights laws and [place] laws. We do not discriminate on the basis of race, color, national origin, age, disability, sex, sexual orientation, or gender identity.            Thank you!     Thank you for choosing [facility]  for your care. Our goal is always to provide you with excellent care. Hearing back from our patients is one way we can continue to improve our services. Please take a few minutes to complete the written survey that you may receive in the mail after your visit with us. Thank you!             Your Updated Medication List - Protect others around you: Learn how to safely use, store and throw away your medicines at www.disposemymeds.org.          This list is accurate as of: 11/8/17  2:45 PM.  Always use your most recent med list.                   Brand Name Dispense Instructions for use Diagnosis    esomeprazole 40 MG CR capsule    nexIUM    30 capsule    Take 1 capsule (40 mg) by mouth every morning (before breakfast) Take 30-60 minutes before a eating.    Chronic cough       * varenicline 0.5 MG X 11 & 1 MG X 42 tablet    CHANTIX STARTING MONTH CHARLI    53 tablet    Take 0.5 mg tab daily for 3 days, then 0.5 mg tab twice daily for 4 days, then 1 mg twice daily.    Tobacco abuse       * varenicline 1 MG tablet    CHANTIX    60 tablet    Take 1 tablet (1 mg) by mouth daily    Tobacco abuse       * Notice:  This list has 2 medication(s) that are the same as other medications prescribed for you. Read the directions carefully, and ask your  doctor or other care provider to review them with you.

## 2018-02-13 DIAGNOSIS — R05.3 CHRONIC COUGH: ICD-10-CM

## 2018-02-13 RX ORDER — ESOMEPRAZOLE MAGNESIUM 40 MG/1
40 CAPSULE, DELAYED RELEASE ORAL
Qty: 90 CAPSULE | Refills: 2 | Status: SHIPPED | OUTPATIENT
Start: 2018-02-13 | End: 2018-02-27

## 2018-02-13 NOTE — TELEPHONE ENCOUNTER
"Requested Prescriptions   Pending Prescriptions Disp Refills     esomeprazole (NEXIUM) 40 MG CR capsule  Last Written Prescription Date:  11/8/2017  Last Fill Quantity: 30 caps,  # refills: 1   Last Office Visit with G, P or [facility] prescribing provider:  11/8/2017  Future Office Visit:      30 capsule 1     Sig: Take 1 capsule (40 mg) by mouth every morning (before breakfast) Take 30-60 minutes before a eating.    PPI Protocol Passed    2/13/2018 11:11 AM       Passed - Not on Clopidogrel (unless Pantoprazole ordered)       Passed - No diagnosis of osteoporosis on record       Passed - Recent or future visit with authorizing provider's specialty    Patient had office visit in the last year or has a visit in the next 30 days with authorizing provider.  See \"Patient Info\" tab in inbasket, or \"Choose Columns\" in Meds & Orders section of the refill encounter.            Passed - Patient is age 18 or older          "

## 2018-02-13 NOTE — TELEPHONE ENCOUNTER
Prescription approved per [facility] Refill Protocol.  Margaret Azevedo RN

## 2018-02-20 ENCOUNTER — TELEPHONE (OUTPATIENT)
Dept: FAMILY MEDICINE | Facility: CLINIC | Age: 38
End: 2018-02-20

## 2018-02-20 DIAGNOSIS — K21.9 GASTROESOPHAGEAL REFLUX DISEASE, ESOPHAGITIS PRESENCE NOT SPECIFIED: Primary | ICD-10-CM

## 2018-02-20 NOTE — TELEPHONE ENCOUNTER
Prior Authorization Retail Medication Request  Medication/Dose: esomeprazole magnesium  Diagnosis and ICD code: chronic cough  New/Renewal/Insurance Change PA: new  Previously Tried and Failed Therapies: unknown    Insurance ID (if provided): [facility] access hospital key: [id number]  Insurance Phone (if provided): unknown    Any additional info from fax request:     If you received a fax notification from an outside Pharmacy:  Pharmacy Name:[facility]  Pharmacy #:[phone]  Pharmacy Fax:[phone]

## 2018-02-21 NOTE — TELEPHONE ENCOUNTER
PA Initiation    Medication: esomeprazole magnesium  Insurance Company: [employer] - Phone [phone] Fax [phone]  Pharmacy Filling the Rx: [facility] [address] [place] [address] [place] - [address] [place] [address] [address]VE [address] AT [facility] & [place]  Filling Pharmacy Phone: [phone]  Filling Pharmacy Fax: [phone]  Start Date: 2/21/2018

## 2018-02-23 NOTE — TELEPHONE ENCOUNTER
Called [employer] at [phone] and per representative this case is still under review and can take up to 8 days for their turn around time.

## 2018-02-26 NOTE — TELEPHONE ENCOUNTER
Patient called back, states he has not taken either of the medications below. He is hoping he can get his medication asap.    Thanks!  .Quoc Farmer  Patient Representative

## 2018-02-26 NOTE — TELEPHONE ENCOUNTER
Left message on answering machine for patient to call back. If patient calls back- ask him the question below.    Keiko Dyer MA

## 2018-02-26 NOTE — TELEPHONE ENCOUNTER
PRIOR AUTHORIZATION DENIED    Medication: esomeprazole magnesium-DENIED    Denial Date: 2/24/2018    Denial Rational: PATIENT NEEDS TO TRY/FAIL TWO FORMULARY ALTERNATIVES -  OMEPRAZOLE, PANTOPRAZOLE.        Appeal Information: IF PATIENT IS UNABLE TO TRY/FAIL ALTERNATIVES PLEASE SUPPLY A LETTER OF MEDICAL NECESSITY WITH CLINICAL REASON.

## 2018-02-27 NOTE — TELEPHONE ENCOUNTER
Script for omeprazole sent to the pharmacy as this is one of the covered agents according to the information below.    Please call patient to let him know.  Thanks.    Romain Riggs MD

## 2018-05-11 ENCOUNTER — TELEPHONE (OUTPATIENT)
Dept: OTHER | Facility: CLINIC | Age: 38
End: 2018-05-11

## 2018-06-02 ENCOUNTER — TRANSFERRED RECORDS (OUTPATIENT)
Dept: HEALTH INFORMATION MANAGEMENT | Facility: CLINIC | Age: 38
End: 2018-06-02

## 2018-09-11 DIAGNOSIS — K21.9 GASTROESOPHAGEAL REFLUX DISEASE, ESOPHAGITIS PRESENCE NOT SPECIFIED: ICD-10-CM

## 2018-09-11 NOTE — TELEPHONE ENCOUNTER
"Requested Prescriptions   Pending Prescriptions Disp Refills     omeprazole (PRILOSEC) 20 MG CR capsule  Last Written Prescription Date:  2/27/2018  Last Fill Quantity: 90 capsule,  # refills: 1   Last office visit: 11/8/2017 with prescribing provider:  VINEET Riggs   Future Office Visit:     90 capsule 1     Sig: Take 1 capsule (20 mg) by mouth daily    PPI Protocol Passed    9/11/2018  6:49 PM       Passed - Not on Clopidogrel (unless Pantoprazole ordered)       Passed - No diagnosis of osteoporosis on record       Passed - Recent (12 mo) or future (30 days) visit within the authorizing provider's specialty    Patient had office visit in the last 12 months or has a visit in the next 30 days with authorizing provider or within the authorizing provider's specialty.  See \"Patient Info\" tab in inbasket, or \"Choose Columns\" in Meds & Orders section of the refill encounter.           Passed - Patient is age 18 or older          "

## 2018-09-13 NOTE — TELEPHONE ENCOUNTER
Prescription approved per [facility] Refill Protocol.  Rufina Mays,Clinic Rn  [place]